# Patient Record
Sex: MALE | Race: WHITE
[De-identification: names, ages, dates, MRNs, and addresses within clinical notes are randomized per-mention and may not be internally consistent; named-entity substitution may affect disease eponyms.]

---

## 2021-10-27 ENCOUNTER — HOSPITAL ENCOUNTER (OUTPATIENT)
Dept: HOSPITAL 35 - PAC | Age: 81
End: 2021-10-27
Attending: SPECIALIST
Payer: COMMERCIAL

## 2021-10-27 DIAGNOSIS — Z01.810: Primary | ICD-10-CM

## 2021-10-27 DIAGNOSIS — I44.7: ICD-10-CM

## 2021-10-27 DIAGNOSIS — Z01.812: ICD-10-CM

## 2021-10-27 LAB
ALBUMIN SERPL-MCNC: 3.8 G/DL (ref 3.4–5)
ALT SERPL-CCNC: 42 U/L (ref 30–65)
ANION GAP SERPL CALC-SCNC: 10 MMOL/L (ref 7–16)
APTT BLD: 39 SECONDS (ref 24.5–32.8)
AST SERPL-CCNC: 22 U/L (ref 15–37)
BILIRUB SERPL-MCNC: 0.3 MG/DL (ref 0.2–1)
BILIRUB UR-MCNC: NEGATIVE MG/DL
BUN SERPL-MCNC: 30 MG/DL (ref 7–18)
CALCIUM SERPL-MCNC: 9.1 MG/DL (ref 8.5–10.1)
CHLORIDE SERPL-SCNC: 109 MMOL/L (ref 98–107)
CO2 SERPL-SCNC: 27 MMOL/L (ref 21–32)
COLOR UR: YELLOW
CREAT SERPL-MCNC: 1.4 MG/DL (ref 0.7–1.3)
ERYTHROCYTE [DISTWIDTH] IN BLOOD BY AUTOMATED COUNT: 16.8 % (ref 10.5–14.5)
GLUCOSE SERPL-MCNC: 111 MG/DL (ref 74–106)
HCT VFR BLD CALC: 35.9 % (ref 42–52)
HGB BLD-MCNC: 11.3 GM/DL (ref 14–18)
INR PPP: 3.05
KETONES UR STRIP-MCNC: NEGATIVE MG/DL
MCH RBC QN AUTO: 28.8 PG (ref 26–34)
MCHC RBC AUTO-ENTMCNC: 31.5 G/DL (ref 28–37)
MCV RBC: 91.3 FL (ref 80–100)
PLATELET # BLD: 158 THOU/UL (ref 150–400)
POTASSIUM SERPL-SCNC: 4.2 MMOL/L (ref 3.5–5.1)
PROT SERPL-MCNC: 6.6 G/DL (ref 6.4–8.2)
PROTHROMBIN TIME: 31.5 SECONDS (ref 10.5–12.1)
RBC # BLD AUTO: 3.93 MIL/UL (ref 4.5–6)
RBC # UR STRIP: NEGATIVE /UL
SODIUM SERPL-SCNC: 146 MMOL/L (ref 136–145)
SP GR UR STRIP: 1.02 (ref 1–1.03)
URINE CLARITY: CLEAR
URINE GLUCOSE-RANDOM*: NEGATIVE
URINE LEUKOCYTES-REFLEX: NEGATIVE
URINE NITRITE-REFLEX: NEGATIVE
URINE PROTEIN (DIPSTICK): NEGATIVE
UROBILINOGEN UR STRIP-ACNC: 0.2 E.U./DL (ref 0.2–1)
WBC # BLD AUTO: 5.8 THOU/UL (ref 4–11)

## 2021-10-28 NOTE — EKG
[place] [address] [facility]
[place]  [address]
Phone:  [phone]                    ELECTROCARDIOGRAM REPORT      
_______________________________________________________________________________
 
Name:       KARLY MCCORD                 Room #:                     [facility]#:      [id number]     Account #:      [id number]  
Admission:  10/27/21    Attend Phys:    Darius Spivey, 
Discharge:              Date of Birth:  [birth year]  
                                                          Report #: [id number]
   [id number]
_______________________________________________________________________________
                          [facility]
                                       
Test Date:    2021-10-27               Test Time:    12:19:47
Pat Name:     KARLY MCCORD              Department:   
Patient ID:   [id number]            Room:          
Gender:                               Technician:   ANA REES
:          1940               Requested By: Darius Spivey
Order Number: [id number] MD:   Jf Aguilar
                                 Measurements
Intervals                              Axis          
Rate:         75                       P:            68
MD:           191                      QRS:          -27
QRSD:         116                      T:            -26
QT:           412                                    
QTc:          461                                    
                           Interpretive Statements
Sinus rhythm
Probable left atrial enlargement
Incomplete left bundle branch block
Anterior Q waves, possibly due to ILBBB
No previous ECG available for comparison
Electronically Signed On 10- 7:02:52 CDT by Jf Aguilar
[email or web address]
 
 
 
 
 
 
 
 
 
 
 
 
 
 
 
 
 
 
 
 
  <ELECTRONICALLY SIGNED>
   By: Jf Aguilar MD, [facility]    
  10/28/21     0702
D: 10/1219                           _____________________________________
T: 10/27/21 1219                           Jf Aguilar MD, FACC      /EPI

## 2021-11-02 ENCOUNTER — HOSPITAL ENCOUNTER (OUTPATIENT)
Dept: HOSPITAL 35 - RAD | Age: 81
End: 2021-11-02
Attending: SPECIALIST
Payer: COMMERCIAL

## 2021-11-02 DIAGNOSIS — Z01.818: ICD-10-CM

## 2021-11-02 DIAGNOSIS — M47.814: Primary | ICD-10-CM

## 2021-11-02 DIAGNOSIS — M48.04: ICD-10-CM

## 2021-11-02 DIAGNOSIS — M41.84: ICD-10-CM

## 2021-11-08 ENCOUNTER — HOSPITAL ENCOUNTER (INPATIENT)
Dept: HOSPITAL 35 - PRE | Age: 81
LOS: 4 days | Discharge: NURSING, REHAB OR LONG-TERM CARE | DRG: 519 | End: 2021-11-12
Attending: SPECIALIST | Admitting: SPECIALIST
Payer: COMMERCIAL

## 2021-11-08 VITALS — HEIGHT: 67.01 IN | BODY MASS INDEX: 26.27 KG/M2 | WEIGHT: 167.38 LBS

## 2021-11-08 VITALS — DIASTOLIC BLOOD PRESSURE: 78 MMHG | SYSTOLIC BLOOD PRESSURE: 148 MMHG

## 2021-11-08 VITALS — SYSTOLIC BLOOD PRESSURE: 151 MMHG | DIASTOLIC BLOOD PRESSURE: 78 MMHG

## 2021-11-08 DIAGNOSIS — D68.59: ICD-10-CM

## 2021-11-08 DIAGNOSIS — G95.20: ICD-10-CM

## 2021-11-08 DIAGNOSIS — E78.5: ICD-10-CM

## 2021-11-08 DIAGNOSIS — I11.0: ICD-10-CM

## 2021-11-08 DIAGNOSIS — Z86.711: ICD-10-CM

## 2021-11-08 DIAGNOSIS — E87.1: ICD-10-CM

## 2021-11-08 DIAGNOSIS — Z98.41: ICD-10-CM

## 2021-11-08 DIAGNOSIS — Z79.01: ICD-10-CM

## 2021-11-08 DIAGNOSIS — I25.10: ICD-10-CM

## 2021-11-08 DIAGNOSIS — M51.24: Primary | ICD-10-CM

## 2021-11-08 DIAGNOSIS — N17.9: ICD-10-CM

## 2021-11-08 DIAGNOSIS — I27.20: ICD-10-CM

## 2021-11-08 DIAGNOSIS — D64.9: ICD-10-CM

## 2021-11-08 DIAGNOSIS — Z98.42: ICD-10-CM

## 2021-11-08 DIAGNOSIS — R53.81: ICD-10-CM

## 2021-11-08 DIAGNOSIS — F32.9: ICD-10-CM

## 2021-11-08 DIAGNOSIS — I42.9: ICD-10-CM

## 2021-11-08 DIAGNOSIS — N18.30: ICD-10-CM

## 2021-11-08 LAB
APTT BLD: 23.9 SECONDS (ref 24.5–32.8)
INR PPP: 1.06
PROTHROMBIN TIME: 11.5 SECONDS (ref 10.5–12.1)

## 2021-11-08 PROCEDURE — 56528: CPT

## 2021-11-08 PROCEDURE — 56525: CPT

## 2021-11-08 PROCEDURE — 51878: CPT

## 2021-11-08 PROCEDURE — 50402: CPT

## 2021-11-08 PROCEDURE — 0RB90ZZ EXCISION OF THORACIC VERTEBRAL DISC, OPEN APPROACH: ICD-10-PCS | Performed by: SPECIALIST

## 2021-11-08 PROCEDURE — 50010 RENAL EXPLORATION: CPT

## 2021-11-08 PROCEDURE — 62110: CPT

## 2021-11-08 PROCEDURE — 50101: CPT

## 2021-11-08 PROCEDURE — 58456: CPT

## 2021-11-08 PROCEDURE — 10102: CPT

## 2021-11-08 PROCEDURE — 50850: CPT

## 2021-11-08 PROCEDURE — 62900: CPT

## 2021-11-08 PROCEDURE — 10195: CPT

## 2021-11-08 PROCEDURE — 58457: CPT

## 2021-11-08 PROCEDURE — 70005: CPT

## 2021-11-09 VITALS — DIASTOLIC BLOOD PRESSURE: 65 MMHG | SYSTOLIC BLOOD PRESSURE: 124 MMHG

## 2021-11-09 VITALS — DIASTOLIC BLOOD PRESSURE: 66 MMHG | SYSTOLIC BLOOD PRESSURE: 141 MMHG

## 2021-11-09 VITALS — SYSTOLIC BLOOD PRESSURE: 152 MMHG | DIASTOLIC BLOOD PRESSURE: 73 MMHG

## 2021-11-09 VITALS — DIASTOLIC BLOOD PRESSURE: 79 MMHG | SYSTOLIC BLOOD PRESSURE: 150 MMHG

## 2021-11-09 LAB
ALBUMIN SERPL-MCNC: 3.6 G/DL (ref 3.4–5)
ALT SERPL-CCNC: 25 U/L (ref 16–63)
ANION GAP SERPL CALC-SCNC: 11 MMOL/L (ref 7–16)
AST SERPL-CCNC: 18 U/L (ref 15–37)
BASOPHILS NFR BLD AUTO: 0.5 % (ref 0–2)
BILIRUB SERPL-MCNC: 0.4 MG/DL (ref 0.2–1)
BUN SERPL-MCNC: 29 MG/DL (ref 7–18)
CALCIUM SERPL-MCNC: 8.7 MG/DL (ref 8.5–10.1)
CHLORIDE SERPL-SCNC: 110 MMOL/L (ref 98–107)
CO2 SERPL-SCNC: 25 MMOL/L (ref 21–32)
CREAT SERPL-MCNC: 1.7 MG/DL (ref 0.7–1.3)
EOSINOPHIL NFR BLD: 0 % (ref 0–3)
ERYTHROCYTE [DISTWIDTH] IN BLOOD BY AUTOMATED COUNT: 17 % (ref 10.5–14.5)
GLUCOSE SERPL-MCNC: 172 MG/DL (ref 74–106)
GRANULOCYTES NFR BLD MANUAL: 84.5 % (ref 36–66)
HCT VFR BLD CALC: 35.3 % (ref 42–52)
HGB BLD-MCNC: 11 GM/DL (ref 14–18)
LYMPHOCYTES NFR BLD AUTO: 7.7 % (ref 24–44)
MAGNESIUM SERPL-MCNC: 1.9 MG/DL (ref 1.8–2.4)
MCH RBC QN AUTO: 29.1 PG (ref 26–34)
MCHC RBC AUTO-ENTMCNC: 31.3 G/DL (ref 28–37)
MCV RBC: 92.8 FL (ref 80–100)
MONOCYTES NFR BLD: 7.3 % (ref 1–8)
NEUTROPHILS # BLD: 7.5 THOU/UL (ref 1.4–8.2)
PLATELET # BLD: 168 THOU/UL (ref 150–400)
POTASSIUM SERPL-SCNC: 4 MMOL/L (ref 3.5–5.1)
PROT SERPL-MCNC: 7 G/DL (ref 6.4–8.2)
RBC # BLD AUTO: 3.8 MIL/UL (ref 4.5–6)
SODIUM SERPL-SCNC: 146 MMOL/L (ref 136–145)
WBC # BLD AUTO: 8.9 THOU/UL (ref 4–11)

## 2021-11-09 NOTE — NUR
Assumed care of pt 0700. Pt alert but forgetful at times. Pain controlled.
Dressing c/d/i. Pt working wit OT and physical therapy today. Call light
within reach. Fall precautions in place. Will continue to monitor.

## 2021-11-09 NOTE — NUR
RECEIVED CARE OF THIS PATIENT AT 1928 FROM PACU.  PATIENT ALERT AND ORIENTED
X4.  DRESSING ON BACK D/I.  REMAINS ON BEDREST FOR THE SHIFT D/T TYPE OF
SURGERY.  HAS TEDS AND SCD'S ON.  SLEPT MOST OF NIGHT.C/O MILD PAIN.

## 2021-11-09 NOTE — NUR
85 year old male admitted for Trans Thoracic Discectomy with HT and Hx of CO.
Patient lives home alone and has listed daughter Pallavi Jamison at [phone]
and Son Rusty Roach at [phone] as family and authorized contacts and his
support system.  The patient has PT orders and will follow for recommendations
from therapy and neurosurgery.  CM will follow for d/c needs based on medical
assessment and therapy recommendations.
 
He up working with OT, A & o x 3 with some forgetfulness. Pleasant and able
to make his needs know. He lives alone, independent, has walker, manage own
medication in a pill box, son and daughter work. He drives, home health in the
past and been to rehab in [facility], post acute and does not want to
return to the facility in [place], his family would like him to go to
acute rehab here per OT.

## 2021-11-10 VITALS — DIASTOLIC BLOOD PRESSURE: 67 MMHG | SYSTOLIC BLOOD PRESSURE: 135 MMHG

## 2021-11-10 VITALS — SYSTOLIC BLOOD PRESSURE: 152 MMHG | DIASTOLIC BLOOD PRESSURE: 76 MMHG

## 2021-11-10 VITALS — SYSTOLIC BLOOD PRESSURE: 128 MMHG | DIASTOLIC BLOOD PRESSURE: 60 MMHG

## 2021-11-10 LAB
ANION GAP SERPL CALC-SCNC: 11 MMOL/L (ref 7–16)
BASOPHILS NFR BLD AUTO: 0.5 % (ref 0–2)
BUN SERPL-MCNC: 27 MG/DL (ref 7–18)
CALCIUM SERPL-MCNC: 8.2 MG/DL (ref 8.5–10.1)
CHLORIDE SERPL-SCNC: 106 MMOL/L (ref 98–107)
CO2 SERPL-SCNC: 26 MMOL/L (ref 21–32)
CREAT SERPL-MCNC: 1.4 MG/DL (ref 0.7–1.3)
EOSINOPHIL NFR BLD: 1 % (ref 0–3)
ERYTHROCYTE [DISTWIDTH] IN BLOOD BY AUTOMATED COUNT: 17 % (ref 10.5–14.5)
GLUCOSE SERPL-MCNC: 119 MG/DL (ref 74–106)
GRANULOCYTES NFR BLD MANUAL: 73.2 % (ref 36–66)
HCT VFR BLD CALC: 31.7 % (ref 42–52)
HGB BLD-MCNC: 10.2 GM/DL (ref 14–18)
LYMPHOCYTES NFR BLD AUTO: 11.2 % (ref 24–44)
MAGNESIUM SERPL-MCNC: 1.9 MG/DL (ref 1.8–2.4)
MCH RBC QN AUTO: 29.5 PG (ref 26–34)
MCHC RBC AUTO-ENTMCNC: 32.1 G/DL (ref 28–37)
MCV RBC: 92.1 FL (ref 80–100)
MONOCYTES NFR BLD: 14.1 % (ref 1–8)
NEUTROPHILS # BLD: 4.5 THOU/UL (ref 1.4–8.2)
PLATELET # BLD: 122 THOU/UL (ref 150–400)
POTASSIUM SERPL-SCNC: 4.1 MMOL/L (ref 3.5–5.1)
RBC # BLD AUTO: 3.44 MIL/UL (ref 4.5–6)
SODIUM SERPL-SCNC: 143 MMOL/L (ref 136–145)
WBC # BLD AUTO: 6.1 THOU/UL (ref 4–11)

## 2021-11-10 NOTE — EKG
[address]
[place]  [address]
Phone:  [phone]                    ELECTROCARDIOGRAM REPORT      
_______________________________________________________________________________
 
Name:       KARLY MCCORD                 Room #:         437-P       ADM IN  
M.R.#:      [id number]     Account #:      [id number]  
Admission:  21    Attend Phys:    Darius Spivey, 
Discharge:              Date of Birth:  [birth year]  
                                                          Report #: [id number]
   [id number]
_______________________________________________________________________________
                          [facility]
                                       
Test Date:    2021               Test Time:    15:01:37
Pat Name:     KARLY MCCORD              Department:   
Patient ID:   [id number]            Room:         437 P
Gender:       M                        Technician:   UNK
:          1940               Requested By: Darius Spivey
Order Number: [id number] MD:   Jf Aguilar
                                 Measurements
Intervals                              Axis          
Rate:         78                       P:            61
OK:           173                      QRS:          -43
QRSD:         115                      T:            145
QT:           399                                    
QTc:          455                                    
                           Interpretive Statements
Sinus rhythm
LVH with IVCD, LAD and secondary repol abnrm
Compared to ECG 10/27/2021 12:19:47
Intraventricular conduction delay now present
Left ventricular hypertrophy now present
Early repolarization now present
Q waves no longer present
Electronically Signed On 11- 10:22:58 CST by Jf Aguilar
[email or web address]
 
 
 
 
 
 
 
 
 
 
 
 
 
 
 
 
 
 
  <ELECTRONICALLY SIGNED>
   By: Jf Aguilar MD, [facility]    
  11/10/21     1022
D: 21 1501                           _____________________________________
T: 21 1501                           Jf Aguilar MD, [facility]      /EPI

## 2021-11-10 NOTE — NUR
RECEIVED CALL FROM KANDACE AT [facility] WHO REQUESTED CLINICAL INFORMATION
REGARDING ACUTE REHAB AUTHORIZATION. CLINICAL INFORMATION FAXED TO NAGI FREDERICK AT [facility], [phone]. YOLY'S PHONE [phone] EXT [phone].
AWAITING RESPONSE.

## 2021-11-10 NOTE — NUR
Assumed care of pt at 0700. Pt a&ox4. SBA with gaitbelt and walker. Denies
pain. Dressing c/d/i. 5N vs SNF. Call light within reach. Will continue to
monitor.

## 2021-11-10 NOTE — NUR
Discussed during los with hospitalist, therapy recommendation: rehab vs
post-acute. 5N consult, would also needs insurance auth from [facility] if
accepted to 5n acute rehab or skilled rehab.

## 2021-11-10 NOTE — NUR
RECEIVED CARE OF THIS PATIENT AT 1900.  PATIENT ALERT AND ORIENTED X4.  UP AD
MARTIN TO BATHROOM.  DRESSING ON BACK INTACT.  C/O ONLY MILD PAIN.  C/O DIARRHEA,
MIRALAX REFUSED.  SLEPT OFF AND ON DURING NIGHT.

## 2021-11-10 NOTE — NUR
CALLED [facility] TO START AUTHORIZATION PROCESS FOR ACUTE REHAB. SPOKE W/ ANGEL.
PENDING AUTH #[id number]. [facility] WILL CONTACT REHAB ADMISSIONS TO REQUEST
CLINICAL INFORMATION, PER ANGEL.

## 2021-11-11 VITALS — DIASTOLIC BLOOD PRESSURE: 72 MMHG | SYSTOLIC BLOOD PRESSURE: 141 MMHG

## 2021-11-11 VITALS — DIASTOLIC BLOOD PRESSURE: 66 MMHG | SYSTOLIC BLOOD PRESSURE: 132 MMHG

## 2021-11-11 VITALS — SYSTOLIC BLOOD PRESSURE: 150 MMHG | DIASTOLIC BLOOD PRESSURE: 68 MMHG

## 2021-11-11 VITALS — SYSTOLIC BLOOD PRESSURE: 150 MMHG | DIASTOLIC BLOOD PRESSURE: 78 MMHG

## 2021-11-11 NOTE — NUR
ASSESSED AT START OF SHIFT. PT RESTING IN BED. UP WITH SBA TO THE BATHROOM.
AQUACEL DRESSING C/D/I. PT HAD A M THIS SHIFT. FALL PREC IN PLACE AND CALL
LIGHT AT REACH WILL CONT TO MONITOR.

## 2021-11-11 NOTE — NUR
PT ASSESSED AT START OF SHIFT. PT C/O DIARRHEA AND WANTING HOME MEDS FOR THIS.
MEDS ORDERED AND BM'S SLOWING DOWN. HAVING MORE SOLID PIECES AND PASSING GAS.
EATING AND DRINKING WELL.
STATES NO MORE BACK PAIN. AMBULATING WELL W/ STANDBY USING WALKER.

## 2021-11-11 NOTE — NUR
NOTICE OF AUTHORIZATION DENIAL RECEIVED. PEER TO PEER COMPLETED THIS DATE BY
REHAB FATOU VINCENT AND DENIAL WAS UPHELD. DISCHARGE PLANNER NOTIFIED OF
DENIAL.
 
THANK YOU FOR THIS REFERRAL.

## 2021-11-11 NOTE — NUR
Cholo notified by Cristina with [facility] that the medical director reviewed and denied
auth for acute rehab, p2p by 11/15/21 at 1100, # [phone]. Cholo passed on
information to attending hospitalist and 5n liaison. 5n NP going to complete
p2p today at 1330. Cholo visited with Ab and his daughter in law at bedside.
Back up skilled facility family and Ab picked was [facility] in
[place], # [phone], fax [phone]. Will cont. following as needed.

## 2021-11-12 VITALS — SYSTOLIC BLOOD PRESSURE: 149 MMHG | DIASTOLIC BLOOD PRESSURE: 74 MMHG

## 2021-11-12 VITALS — DIASTOLIC BLOOD PRESSURE: 78 MMHG | SYSTOLIC BLOOD PRESSURE: 157 MMHG

## 2021-11-12 LAB
ALBUMIN SERPL-MCNC: 3 G/DL (ref 3.4–5)
ALT SERPL-CCNC: 15 U/L (ref 30–65)
ANION GAP SERPL CALC-SCNC: 11 MMOL/L (ref 7–16)
AST SERPL-CCNC: 14 U/L (ref 15–37)
BASOPHILS NFR BLD AUTO: 0.7 % (ref 0–2)
BILIRUB SERPL-MCNC: 0.5 MG/DL (ref 0.2–1)
BUN SERPL-MCNC: 27 MG/DL (ref 7–18)
CALCIUM SERPL-MCNC: 8.7 MG/DL (ref 8.5–10.1)
CHLORIDE SERPL-SCNC: 105 MMOL/L (ref 98–107)
CO2 SERPL-SCNC: 25 MMOL/L (ref 21–32)
CREAT SERPL-MCNC: 1.5 MG/DL (ref 0.7–1.3)
EOSINOPHIL NFR BLD: 2.7 % (ref 0–3)
ERYTHROCYTE [DISTWIDTH] IN BLOOD BY AUTOMATED COUNT: 16.7 % (ref 10.5–14.5)
GLUCOSE SERPL-MCNC: 182 MG/DL (ref 74–106)
GRANULOCYTES NFR BLD MANUAL: 76.7 % (ref 36–66)
HCT VFR BLD CALC: 32.5 % (ref 42–52)
HGB BLD-MCNC: 10.4 GM/DL (ref 14–18)
INR PPP: 1
LYMPHOCYTES NFR BLD AUTO: 8.9 % (ref 24–44)
MAGNESIUM SERPL-MCNC: 2.1 MG/DL (ref 1.8–2.4)
MCH RBC QN AUTO: 29.3 PG (ref 26–34)
MCHC RBC AUTO-ENTMCNC: 31.9 G/DL (ref 28–37)
MCV RBC: 91.9 FL (ref 80–100)
MONOCYTES NFR BLD: 11 % (ref 1–8)
NEUTROPHILS # BLD: 3.5 THOU/UL (ref 1.4–8.2)
PLATELET # BLD: 132 THOU/UL (ref 150–400)
POTASSIUM SERPL-SCNC: 4.2 MMOL/L (ref 3.5–5.1)
PROT SERPL-MCNC: 6.8 G/DL (ref 6.4–8.2)
PROTHROMBIN TIME: 10.9 SECONDS (ref 10.5–12.1)
RBC # BLD AUTO: 3.53 MIL/UL (ref 4.5–6)
SODIUM SERPL-SCNC: 141 MMOL/L (ref 136–145)
WBC # BLD AUTO: 4.6 THOU/UL (ref 4–11)

## 2021-11-12 NOTE — NUR
Discussed during los with attending physician, getting labs today. [facility]
still requesting auth for skilled rehab. Cm notified by [facility] they have
insurance auth from [facility] and can accept for skilled rehab today. Cm passed
on information to hospitalist and bedside. Chart copy requested. Bedside nurse
to call report at [place] to # [phone]. He will be able to go by wheelchair
van, on RA.

## 2021-11-12 NOTE — NUR
ASSUMED PT CARE AT 1900.PT DENIED PAIN SO FAR.UP WITH ASSIST TO THE BSC.NO C/O
DIARRHEA NOTED SO FAR.PT ABLE TO MAKE HIS NEEDS KNOWN.PT LOOKING FORWAED TO BE
DC'D SOON TO REHAB.CALL LIGHT WITHIN REACH.

## 2021-11-12 NOTE — NUR
Assumed care of pt at 0700. Pt a&ox4. Denies pain. Dressing changed. Walked
the halls with physical therapy. Looking for placement. Call light within
reach. Fall precautions in place. Will continue to monitor.

## 2021-11-17 NOTE — HC
[facility]
[address]
[place]   [address]                     CONSULTATION                  
_______________________________________________________________________________
 
Name:       KARLY MCCORD                 Room #:         437-P       [facility] [place]  
..#:      [id number]                       Account #:      [id number]  
Admission:  11/08/21    Attend Phys:    Darius Spivey, 
Discharge:  11/12/21    Date of Birth:  [birth year]  
                                                          Report #: [id number]
                                                                    [id number] 
_______________________________________________________________________________
THIS REPORT FOR:  
 
cc:  Bin Kessler MD,Bin Velarde,Faisal FRIAS MD                                         ~
 
DATE OF SERVICE: 11/10/2021
 
HISTORY OF PRESENT ILLNESS:  The patient is an 81-year-old white male who was 
having more and more problems with his gait and functional mobility with pain 
with limited attempts at ambulation.  He was noted to have a T9-T10 herniated 
disk with spinal cord compression and underwent a right T9-T10 
costotransversectomy with transpedicular diskectomy on 11/08/2021.  He has been 
followed regarding chronic kidney disease.  He does have multiple medical 
comorbidities as noted below.  Denied loss of bowel or bladder.  He feels that 
his back pain is overall improved post the surgery.  We are seeing him in 
rehabilitation medicine consultation.
 
PAST MEDICAL HISTORY:  Includes cardiomyopathy, depression, pulmonary edema, 
hypertension, history of pulmonary embolism for which he was on Coumadin, 
coronary artery disease, status post bilateral carotid endarterectomies, right 
leg bypass.
 
MEDICATIONS:  Please see the full medication listing.
 
ALLERGIES:  No known drug allergies.
 
SOCIAL HISTORY:  Lives in a house alone, lost his wife approximately 14 months 
ago, single level house.  There are a son and a daughter in the area both work. 
He used a 4-wheeled walker, premorbidly.
 
REVIEW OF SYSTEMS:  No current complaints of chest pain, shortness of breath or 
abdominal discomfort.
 
PHYSICAL EXAMINATION:
GENERAL:  An 81-year-old white male in no obvious distress.
VITAL SIGNS:  Last recorded temperature 97.7, pulse 89, respirations 20, blood 
pressure 152/76.  The patient is alert, pleasant.
HEENT:  Appeared to be benign.
NEUROLOGIC:  Cranial nerves are grossly intact.  Facies are symmetric.  He has 
the midline back incision over his posterior thoracic spine, which has a DuoDERM
type dressing in place.
EXTREMITIES:  He has functional range of motion of the upper extremities, 
strength is probably a grade 4-/5.  Lower extremities, no focal calf swelling, 
functional range of motion with strength grade 4- to 3+/5.  Toilet transfers are
moderate assistance.  Toileting is min assist for OT. Lower extremity dressing 
 
 
 
[address]   [address]                     CONSULTATION                  
_______________________________________________________________________________
 
Name:       KARLY MCCORD                 Room #:         437-P       [facility] [place]  
..#:      [id number]                       Account #:      [id number]  
Admission:  11/08/21    Attend Phys:    Darius Spivey, 
Discharge:  11/12/21    Date of Birth:  [birth year]  
                                                          Report #: [id number]
                                                                    [id number] 
_______________________________________________________________________________
 
is max assist.  He is able to min assist with sit to stand and did ambulate 250 
feet min assist with a front-wheeled walker.
 
ASSESSMENT:  An 81-year-old male with the following problem list:
1.  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
costotransversectomy and transpedicular diskectomy 11/08/2021.
2.  Functional mobility and ADL deficits.
3.  History of pulmonary embolism x2.
4.  Cardiomyopathy.
5.  Coronary artery disease, status post bilateral carotid endarterectomies.
6.  Hypertension.
7.  Chronic kidney disease.
 
PLAN:  Regarding his history of pulmonary embolism, he is okayed by Neurosurgery
to resume the Coumadin postop 3 days.  He is motivated to further improve his 
overall strength and functional mobility and ADL independence, so he can 
eventually return back to the home setting.  He is well motivated in therapy 
indicates that he would be able to tolerate 3 hours per day of intensive 
inpatient rehabilitation.  He does have the significant medical comorbidities 
and will need to be monitored as he is re-anticoagulated for his pulmonary 
embolism post-spinal surgery.  Insurance precertification issues to be checked 
regarding a short acute in-hospital inpatient rehabilitation stay to maximize 
his functional independence, so he can hopefully return back to his prior living
situation after an acute in-hospital inpatient rehabilitation stay.
 
Thank you for asking us to assist in this patient's care.
 
 
 
 
 
 
 
 
 
 
 
 
 
 
 
 
 
  <ELECTRONICALLY SIGNED>
   By: Faisal Velarde MD         
  11/17/21     1242
D: 11/10/21 1108                           _____________________________________
T: 11/10/21 1950                           Faisal Velarde MD           /nt